# Patient Record
Sex: FEMALE | Race: BLACK OR AFRICAN AMERICAN | Employment: UNEMPLOYED | URBAN - METROPOLITAN AREA
[De-identification: names, ages, dates, MRNs, and addresses within clinical notes are randomized per-mention and may not be internally consistent; named-entity substitution may affect disease eponyms.]

---
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
## 2023-12-05 ENCOUNTER — HOSPITAL ENCOUNTER (EMERGENCY)
Facility: HOSPITAL | Age: 18
Discharge: HOME | End: 2023-12-05
Attending: EMERGENCY MEDICINE
Payer: COMMERCIAL

## 2023-12-05 ENCOUNTER — APPOINTMENT (OUTPATIENT)
Dept: CT IMAGING | Facility: HOSPITAL | Age: 18
End: 2023-12-05
Payer: COMMERCIAL

## 2023-12-05 VITALS
BODY MASS INDEX: 43.36 KG/M2 | WEIGHT: 253.97 LBS | RESPIRATION RATE: 17 BRPM | HEIGHT: 64 IN | TEMPERATURE: 98.1 F | OXYGEN SATURATION: 98 % | SYSTOLIC BLOOD PRESSURE: 114 MMHG | HEART RATE: 86 BPM | DIASTOLIC BLOOD PRESSURE: 74 MMHG

## 2023-12-05 DIAGNOSIS — R51.9 ACUTE NONINTRACTABLE HEADACHE, UNSPECIFIED HEADACHE TYPE: Primary | ICD-10-CM

## 2023-12-05 PROCEDURE — 96372 THER/PROPH/DIAG INJ SC/IM: CPT

## 2023-12-05 PROCEDURE — 99284 EMERGENCY DEPT VISIT MOD MDM: CPT

## 2023-12-05 PROCEDURE — 63710000001 ONDANSETRON PER 8 MG: Performed by: NURSE PRACTITIONER

## 2023-12-05 PROCEDURE — 70450 CT HEAD/BRAIN W/O DYE: CPT

## 2023-12-05 RX ORDER — SUMATRIPTAN 6 MG/.5ML
6 INJECTION, SOLUTION SUBCUTANEOUS ONCE
Status: COMPLETED | OUTPATIENT
Start: 2023-12-05 | End: 2023-12-05

## 2023-12-05 RX ORDER — DICLOFENAC SODIUM 75 MG/1
75 TABLET, DELAYED RELEASE ORAL 2 TIMES DAILY PRN
Qty: 20 TABLET | Refills: 0 | Status: SHIPPED | OUTPATIENT
Start: 2023-12-05

## 2023-12-05 RX ORDER — ONDANSETRON 4 MG/1
4 TABLET, FILM COATED ORAL ONCE
Status: COMPLETED | OUTPATIENT
Start: 2023-12-05 | End: 2023-12-05

## 2023-12-05 RX ADMIN — SUMATRIPTAN 6 MG: 6 INJECTION, SOLUTION SUBCUTANEOUS at 17:15

## 2023-12-05 RX ADMIN — ONDANSETRON HYDROCHLORIDE 4 MG: 4 TABLET, FILM COATED ORAL at 17:15

## 2023-12-05 NOTE — ED NOTES
Pt ambulates to ED room PO16. Pt reports left sided headache x 6 days that has been constant. She has tried taking advil migraine, ibuprofen and tylenol without any relief. Rates her pain at a 6/10. Denies chest pain, soa, abdominal pain, nausea, vomiting, diarrhea and vision changes. No other complaints at this time.

## 2023-12-05 NOTE — DISCHARGE INSTRUCTIONS
Push clear liquids    Use Voltaren as needed for discomfort    Follow-up with primary care for any further problems or return to the ER if worse

## 2023-12-05 NOTE — ED PROVIDER NOTES
Subjective   History of Present Illness  Patient is a 18-year-old female who has a frontal headache for the last 7 days she states she has a history of headaches but they do not usually last this long she is taken Tylenol Advil and Advil migraine without success she has not taken anything for pain today she rates her pain a 6/10 she denies any cough congestion fever chills IV drug use or exposure to COVID-19      Review of Systems   Constitutional:  Negative for chills, fatigue and fever.   HENT:  Negative for congestion, tinnitus and trouble swallowing.    Eyes:  Negative for photophobia, discharge and redness.   Respiratory:  Negative for cough and shortness of breath.    Cardiovascular:  Negative for chest pain and palpitations.   Gastrointestinal:  Negative for abdominal pain, diarrhea, nausea and vomiting.   Genitourinary:  Negative for dysuria, frequency and urgency.   Musculoskeletal:  Negative for back pain, joint swelling and myalgias.   Skin:  Negative for rash.   Neurological:  Positive for headaches. Negative for dizziness.   Psychiatric/Behavioral:  Negative for confusion.    All other systems reviewed and are negative.      History reviewed. No pertinent past medical history.    No Known Allergies    Past Surgical History:   Procedure Laterality Date    HAND SURGERY Left        History reviewed. No pertinent family history.    Social History     Socioeconomic History    Marital status: Single   Tobacco Use    Smoking status: Every Day     Packs/day: .5     Types: Cigarettes   Substance and Sexual Activity    Alcohol use: Never    Drug use: Never           Objective   Physical Exam  Vitals reviewed.   Constitutional:       General: She is not in acute distress.     Appearance: She is well-developed. She is obese. She is not toxic-appearing.   HENT:      Head: Normocephalic and atraumatic.   Eyes:      Conjunctiva/sclera: Conjunctivae normal.      Pupils: Pupils are equal, round, and reactive to light.  "  Cardiovascular:      Rate and Rhythm: Normal rate and regular rhythm.      Heart sounds: Normal heart sounds.   Pulmonary:      Effort: Pulmonary effort is normal. No respiratory distress.      Breath sounds: Normal breath sounds. No wheezing.   Abdominal:      General: Bowel sounds are normal. There is no distension.      Palpations: Abdomen is soft. There is no mass.      Tenderness: There is no abdominal tenderness. There is no guarding or rebound.   Musculoskeletal:         General: No deformity. Normal range of motion.      Cervical back: Normal range of motion and neck supple.   Skin:     General: Skin is warm and dry.      Capillary Refill: Capillary refill takes less than 2 seconds.   Neurological:      Mental Status: She is alert and oriented to person, place, and time.      GCS: GCS eye subscore is 4. GCS verbal subscore is 5. GCS motor subscore is 6.      Cranial Nerves: No cranial nerve deficit.   Psychiatric:         Mood and Affect: Mood normal.         Behavior: Behavior normal.         Procedures           ED Course  ED Course as of 12/05/23 2056 Tue Dec 05, 2023   1709 Marked ready for CAT scan [KW]   1807 Patient reports headache 1 of 10 after imitrex [KW]      ED Course User Index  [KW] Jeannette Hernandez, APRN    /74   Pulse 86   Temp 98.1 °F (36.7 °C) (Temporal)   Resp 17   Ht 162.6 cm (64\")   Wt 115 kg (253 lb 15.5 oz)   LMP 11/26/2023 (Approximate)   SpO2 98%   BMI 43.59 kg/m²   Labs Reviewed - No data to display  Medications   SUMAtriptan (IMITREX) injection 6 mg (6 mg Subcutaneous Given 12/5/23 1715)   ondansetron (ZOFRAN) tablet 4 mg (4 mg Oral Given 12/5/23 1715)     CT Head Without Contrast    Result Date: 12/5/2023  Impression: No acute intracranial abnormality. Electronically Signed: Vinod Schmidt MD  12/5/2023 5:31 PM EST  Workstation ID: [id number]                                            Medical Decision Making  Patient is an obese 18-year-old female who comes in " with headache that she states she has had 7 days.  She has a history of migraines she states she has had no nausea no vomiting no vision changes no paresthesias no cough or congestion she states that she has had no no head trauma and has not been lightheaded.  She denies any IV drug use.  She denies it being the worst headache of her life she denies thunderclap episode.  The patient had above exam and CT was obtained and reviewed and read by the radiologist as well as myself as negative.  The patient was given Imitrex which completely resolved her pain she verbalized and understood discharge instructions and to return if worse patient verbalized understood and the need to return if worse    Problems Addressed:  Acute nonintractable headache, unspecified headache type: complicated acute illness or injury    Amount and/or Complexity of Data Reviewed  Radiology: ordered and independent interpretation performed. Decision-making details documented in ED Course.  ECG/medicine tests: ordered and independent interpretation performed. Decision-making details documented in ED Course.    Risk  Prescription drug management.        Final diagnoses:   Acute nonintractable headache, unspecified headache type       ED Disposition  ED Disposition       ED Disposition   Discharge    Condition   Stable    Comment   --               Ewelina Perez MD  [address]  [place] [address]  [phone]    In 5 days  As needed, If symptoms worsen    Jason Villalpando PA-C  [address]  [place] [address]  [phone]    In 3 days  If symptoms worsen, As needed         Medication List        New Prescriptions      diclofenac 75 MG EC tablet  Commonly known as: VOLTAREN  Take 1 tablet by mouth 2 (Two) Times a Day As Needed (pain).               Where to Get Your Medications        These medications were [facility] to [id number] - [place], [place] - [address] AT [address] -  [phone]  - [phone] [address] [place] [address]      Phone: [phone]   diclofenac 75 MG EC tablet            Jeannette Hernandez, APRN  12/05/23 2056

## 2023-12-05 NOTE — Clinical Note
[facility] EMERGENCY DEPARTMENT  [address] [place] [address]  Phone: [phone]    Yuliet Shore was seen and treated in our emergency department on 12/5/2023.  She may return to work on 12/06/2023.         Thank you for choosing [facility].    Silvia Cool RN

## 2024-06-23 ENCOUNTER — HOSPITAL ENCOUNTER (EMERGENCY)
Facility: HOSPITAL | Age: 19
Discharge: HOME | End: 2024-06-23
Admitting: EMERGENCY MEDICINE
Payer: COMMERCIAL

## 2024-06-23 ENCOUNTER — APPOINTMENT (OUTPATIENT)
Dept: GENERAL RADIOLOGY | Facility: HOSPITAL | Age: 19
End: 2024-06-23
Payer: COMMERCIAL

## 2024-06-23 VITALS
HEART RATE: 80 BPM | TEMPERATURE: 98.5 F | SYSTOLIC BLOOD PRESSURE: 120 MMHG | HEIGHT: 63 IN | OXYGEN SATURATION: 98 % | DIASTOLIC BLOOD PRESSURE: 66 MMHG | WEIGHT: 255.73 LBS | RESPIRATION RATE: 16 BRPM | BODY MASS INDEX: 45.31 KG/M2

## 2024-06-23 DIAGNOSIS — S60.052A CONTUSION OF LEFT LITTLE FINGER WITHOUT DAMAGE TO NAIL, INITIAL ENCOUNTER: ICD-10-CM

## 2024-06-23 DIAGNOSIS — M79.645 PAIN OF FINGER OF LEFT HAND: Primary | ICD-10-CM

## 2024-06-23 PROCEDURE — 99283 EMERGENCY DEPT VISIT LOW MDM: CPT

## 2024-06-23 PROCEDURE — 73140 X-RAY EXAM OF FINGER(S): CPT

## 2024-06-23 NOTE — Clinical Note
[facility] EMERGENCY DEPARTMENT  [address] [place] [address]  Phone: [phone]    Yuliet Shore was seen and treated in our emergency department on 6/23/2024.  She may return to work on 06/24/2024.         Thank you for choosing [facility].    Mildred Dexter RN

## 2024-06-24 NOTE — DISCHARGE INSTRUCTIONS
Use Tylenol and Motrin as needed for discomfort at home follow-up with one of the orthopedic doctors listed above if still painful in 10 days return to the ER if worse

## 2024-06-24 NOTE — ED PROVIDER NOTES
"Subjective   Chief Complaint   Patient presents with    Finger Injury     Left hand 5th digit pain s/p fall tonight.         History of Present Illness  Patient reports that she was at work and had a fall and feels like there may be a dislocation to her left fifth digit.  Reports that her finger does not want to bend.  Denies striking head.  Denies any additional pain.  Review of Systems   Musculoskeletal:  Positive for joint swelling (Left fifth digit).   Skin:  Negative for color change, pallor, rash and wound.       History reviewed. No pertinent past medical history.    No Known Allergies    Past Surgical History:   Procedure Laterality Date    HAND SURGERY Left        History reviewed. No pertinent family history.    Social History     Socioeconomic History    Marital status: Single   Tobacco Use    Smoking status: Every Day     Current packs/day: 0.50     Types: Cigarettes   Substance and Sexual Activity    Alcohol use: Never    Drug use: Never           Objective   Physical Exam  Vitals and nursing note reviewed.   Constitutional:       Appearance: Normal appearance.   HENT:      Head: Normocephalic and atraumatic.   Cardiovascular:      Rate and Rhythm: Normal rate and regular rhythm.   Pulmonary:      Effort: Pulmonary effort is normal.      Breath sounds: Normal breath sounds.   Musculoskeletal:         General: Swelling and tenderness present.      Right hand: Normal.      Left hand: Swelling and tenderness present.      Comments: Swelling and tenderness noted to left fifth digit.  Patient is neurovascularly intact.  Good cap refill and pulses.   Skin:     Capillary Refill: Capillary refill takes less than 2 seconds.         Procedures           ED Course      /73 (BP Location: Left arm, Patient Position: Sitting)   Pulse 87   Temp 98.1 °F (36.7 °C) (Oral)   Resp 18   Ht 160 cm (63\")   Wt 116 kg (255 lb 11.7 oz)   LMP 05/30/2024 (Approximate)   SpO2 97%   BMI 45.30 kg/m²   Labs Reviewed - No " data to display  Medications - No data to display  XR Finger 2+ View Left    Result Date: 6/23/2024  Impression: No acute osseous abnormality. Electronically Signed: Vicky Lopez MD  6/23/2024 9:58 PM EDT  Workstation ID: [id number]                                          Medical Decision Making  Problems Addressed:  Contusion of left little finger without damage to nail, initial encounter: complicated acute illness or injury  Pain of finger of left hand: complicated acute illness or injury    Amount and/or Complexity of Data Reviewed  Radiology: ordered.      X-rays show no acute injury or dislocation.  Patient advised to alternate ibuprofen and Tylenol as needed for pain and follow-up with Ortho if pain is not better within 10 days.  Patient understands and is agreeable to this plan of care.    Final diagnoses:   Pain of finger of left hand   Contusion of left little finger without damage to nail, initial encounter       ED Disposition  ED Disposition       ED Disposition   Discharge    Condition   Stable    Comment   --               Robin Srivastava MD  [address] [facility] [place] [address]  [phone]    In 3 days  If symptoms worsen, As needed    Jorge Trujillo II, MD  [address] [facility] [place] [address]  [phone]    In 3 days  If symptoms worsen, As needed         Medication List        Stop      diclofenac 75 MG EC tablet  Commonly known as: Jeannette Cerda, APRN  06/23/24 [id number]